# Patient Record
Sex: FEMALE | Race: WHITE | NOT HISPANIC OR LATINO | URBAN - METROPOLITAN AREA
[De-identification: names, ages, dates, MRNs, and addresses within clinical notes are randomized per-mention and may not be internally consistent; named-entity substitution may affect disease eponyms.]

---

## 2017-04-14 ENCOUNTER — OUTPATIENT (OUTPATIENT)
Dept: OUTPATIENT SERVICES | Facility: HOSPITAL | Age: 67
LOS: 1 days | End: 2017-04-14
Payer: MEDICARE

## 2017-04-14 PROCEDURE — 71020: CPT | Mod: 26

## 2018-03-11 ENCOUNTER — EMERGENCY (EMERGENCY)
Facility: HOSPITAL | Age: 68
LOS: 1 days | End: 2018-03-11
Payer: MEDICARE

## 2018-03-11 PROCEDURE — 99283 EMERGENCY DEPT VISIT LOW MDM: CPT

## 2019-05-09 ENCOUNTER — APPOINTMENT (OUTPATIENT)
Dept: RADIOLOGY | Facility: CLINIC | Age: 69
End: 2019-05-09
Payer: MEDICARE

## 2019-05-09 PROCEDURE — 71046 X-RAY EXAM CHEST 2 VIEWS: CPT

## 2020-12-31 ENCOUNTER — APPOINTMENT (OUTPATIENT)
Dept: CARDIOLOGY | Facility: CLINIC | Age: 70
End: 2020-12-31
Payer: MEDICARE

## 2020-12-31 ENCOUNTER — NON-APPOINTMENT (OUTPATIENT)
Age: 70
End: 2020-12-31

## 2020-12-31 VITALS
DIASTOLIC BLOOD PRESSURE: 68 MMHG | SYSTOLIC BLOOD PRESSURE: 122 MMHG | BODY MASS INDEX: 24.99 KG/M2 | WEIGHT: 150 LBS | HEART RATE: 67 BPM | HEIGHT: 65 IN

## 2020-12-31 DIAGNOSIS — Z78.9 OTHER SPECIFIED HEALTH STATUS: ICD-10-CM

## 2020-12-31 DIAGNOSIS — Z72.3 LACK OF PHYSICAL EXERCISE: ICD-10-CM

## 2020-12-31 DIAGNOSIS — Z80.3 FAMILY HISTORY OF MALIGNANT NEOPLASM OF BREAST: ICD-10-CM

## 2020-12-31 PROCEDURE — 99205 OFFICE O/P NEW HI 60 MIN: CPT

## 2020-12-31 PROCEDURE — 93000 ELECTROCARDIOGRAM COMPLETE: CPT

## 2020-12-31 NOTE — REASON FOR VISIT
[Consultation] : a consultation regarding [Pacemaker Evaluation] : pacemaker ~T evaluation ~C was performed [Supraventricular Tachycardia] : supraventricular tachycardia [FreeTextEntry1] : I saw this asymptomatic 70-year-old woman in cardiac consultation on  12/31/20\par She had no significant medical problems until 2 years ago when she developed an SVT.  She had an ablation which resulted in her developing atrial fibrillation.  Post procedure she required a permanent pacemaker.\par She is anticoagulated with Eliquis and takes propafenone to prevent the atrial fibrillation\par She has no other medical problems\par During her work-up prior to the ablation a stress echo showed 2 mm of ST horizontal depression.

## 2020-12-31 NOTE — REVIEW OF SYSTEMS
normal appearance , without tenderness upon palpation , no deformities , trachea midline , Thyroid normal size , no thyroid nodules , no masses , no JVD , thyroid nontender
[Negative] : Heme/Lymph

## 2020-12-31 NOTE — PHYSICAL EXAM
[General Appearance - Well Developed] : well developed [Normal Appearance] : normal appearance [Well Groomed] : well groomed [General Appearance - Well Nourished] : well nourished [No Deformities] : no deformities [General Appearance - In No Acute Distress] : no acute distress Patient [Normal Conjunctiva] : the conjunctiva exhibited no abnormalities [Eyelids - No Xanthelasma] : the eyelids demonstrated no xanthelasmas [Normal Oral Mucosa] : normal oral mucosa [No Oral Pallor] : no oral pallor [No Oral Cyanosis] : no oral cyanosis [Normal Jugular Venous A Waves Present] : normal jugular venous A waves present [Normal Jugular Venous V Waves Present] : normal jugular venous V waves present [No Jugular Venous Christianson A Waves] : no jugular venous chritsianson A waves [Heart Rate And Rhythm] : heart rate and rhythm were normal [Heart Sounds] : normal S1 and S2 [Murmurs] : no murmurs present [Respiration, Rhythm And Depth] : normal respiratory rhythm and effort [Exaggerated Use Of Accessory Muscles For Inspiration] : no accessory muscle use [Auscultation Breath Sounds / Voice Sounds] : lungs were clear to auscultation bilaterally [Abdomen Soft] : soft [Abdomen Tenderness] : non-tender [Abdomen Mass (___ Cm)] : no abdominal mass palpated [Abnormal Walk] : normal gait [Gait - Sufficient For Exercise Testing] : the gait was sufficient for exercise testing [Nail Clubbing] : no clubbing of the fingernails [Cyanosis, Localized] : no localized cyanosis [Petechial Hemorrhages (___cm)] : no petechial hemorrhages [Skin Color & Pigmentation] : normal skin color and pigmentation [] : no rash [No Venous Stasis] : no venous stasis [Skin Lesions] : no skin lesions [No Skin Ulcers] : no skin ulcer [No Xanthoma] : no  xanthoma was observed [Oriented To Time, Place, And Person] : oriented to person, place, and time [Affect] : the affect was normal [Mood] : the mood was normal [No Anxiety] : not feeling anxious

## 2020-12-31 NOTE — DISCUSSION/SUMMARY
[FreeTextEntry1] : I suggested that she have a cardiac CTA to evaluate her coronary arteries.\par She will return in 4 to 6 months and this will be set up.

## 2021-01-22 ENCOUNTER — APPOINTMENT (OUTPATIENT)
Dept: CARDIOLOGY | Facility: CLINIC | Age: 71
End: 2021-01-22
Payer: MEDICARE

## 2021-01-22 ENCOUNTER — EMERGENCY (EMERGENCY)
Facility: HOSPITAL | Age: 71
LOS: 1 days | End: 2021-01-22
Admitting: EMERGENCY MEDICINE
Payer: MEDICARE

## 2021-01-22 VITALS
BODY MASS INDEX: 24.66 KG/M2 | HEART RATE: 124 BPM | WEIGHT: 148 LBS | OXYGEN SATURATION: 98 % | DIASTOLIC BLOOD PRESSURE: 68 MMHG | SYSTOLIC BLOOD PRESSURE: 124 MMHG | HEIGHT: 65 IN | TEMPERATURE: 97.8 F

## 2021-01-22 PROCEDURE — 99285 EMERGENCY DEPT VISIT HI MDM: CPT

## 2021-01-22 PROCEDURE — 93010 ELECTROCARDIOGRAM REPORT: CPT

## 2021-01-22 PROCEDURE — 93000 ELECTROCARDIOGRAM COMPLETE: CPT | Mod: 59

## 2021-01-22 PROCEDURE — ZZZZZ: CPT

## 2021-01-22 PROCEDURE — 99214 OFFICE O/P EST MOD 30 MIN: CPT

## 2021-01-22 PROCEDURE — 93280 PM DEVICE PROGR EVAL DUAL: CPT

## 2021-01-22 NOTE — HISTORY OF PRESENT ILLNESS
[FreeTextEntry1] : CRYSTAL MCGRAW is a 70 year old female with a past medical history of SVT s/p ablation. Subsequent development of atrial fibrillation. Post procedure required PPM. \par \par During her work-up prior to the ablation a stress echo showed 2 mm of ST horizontal depression.\par \par Called with complaints of dizziness, lightheadedness, palpitations, SOB, and elevated heart rates since 7 am. There is lower extremity edema. Denies CP and syncope. Presented today for eval. After device interrogation, she became increasingly symptomatic. SBP 60. Assisted to table. 22 gauge IV placed. IVF administered. /70. Sx's abated. EMS arrived. Please also note patient took additional Propafenone today.\par \par See device note from today's interrogation for more details.\par \par \par Testing:\par \par EKG 1/22/21: SR at 64 bpm, MT interval 130 ms, QTc 385 ms nonspecific ST-T wave abnormalities \par \par \par Echo 7/11/19: Concentric LVH. LVSF normal. Mild LAE. Mild NATHALIE. RV size is borderline increased. Trace TR. Trace MT. Trace pericardial effusion is noted.

## 2021-01-22 NOTE — PHYSICAL EXAM
[General Appearance - Well Developed] : well developed [Normal Appearance] : normal appearance [Well Groomed] : well groomed [No Deformities] : no deformities [General Appearance - Well Nourished] : well nourished [General Appearance - In No Acute Distress] : no acute distress [Normal Conjunctiva] : the conjunctiva exhibited no abnormalities [Eyelids - No Xanthelasma] : the eyelids demonstrated no xanthelasmas [Normal Oral Mucosa] : normal oral mucosa [No Oral Pallor] : no oral pallor [No Oral Cyanosis] : no oral cyanosis [Normal Jugular Venous A Waves Present] : normal jugular venous A waves present [Normal Jugular Venous V Waves Present] : normal jugular venous V waves present [No Jugular Venous Christianson A Waves] : no jugular venous christianson A waves [Respiration, Rhythm And Depth] : normal respiratory rhythm and effort [Exaggerated Use Of Accessory Muscles For Inspiration] : no accessory muscle use [Auscultation Breath Sounds / Voice Sounds] : lungs were clear to auscultation bilaterally [Heart Rate And Rhythm] : heart rate and rhythm were normal [Heart Sounds] : normal S1 and S2 [Murmurs] : no murmurs present [Abdomen Soft] : soft [Abdomen Tenderness] : non-tender [Abdomen Mass (___ Cm)] : no abdominal mass palpated [Abnormal Walk] : normal gait [Gait - Sufficient For Exercise Testing] : the gait was sufficient for exercise testing [Nail Clubbing] : no clubbing of the fingernails [Cyanosis, Localized] : no localized cyanosis [Petechial Hemorrhages (___cm)] : no petechial hemorrhages [Skin Color & Pigmentation] : normal skin color and pigmentation [] : no rash [No Venous Stasis] : no venous stasis [Skin Lesions] : no skin lesions [No Skin Ulcers] : no skin ulcer [No Xanthoma] : no  xanthoma was observed [Oriented To Time, Place, And Person] : oriented to person, place, and time [Affect] : the affect was normal [Mood] : the mood was normal [No Anxiety] : not feeling anxious

## 2021-01-22 NOTE — PROCEDURE
[NSR] : normal sinus rhythm [Pacemaker] : pacemaker [DDDR] : DDDR [Lead Imp:  ___ohms] : lead impedance was [unfilled] ohms [Sensing Amplitude ___mv] : sensing amplitude was [unfilled] mv [___V @] : [unfilled] V [___ ms] : [unfilled] ms [de-identified] : [place] [de-identified] : Ingevity MRI [de-identified] : [id number] [de-identified] : 5/2019 [de-identified] :  [de-identified] : Battery 14 years [de-identified] : AP 43%,  1%.\par \par Settings:\par \par Atrium Amplitude 2.5 V, pulse width 0.5 ms, sensitivity 0.25 mV\par Ventricle Ampltidue 2.5V ---> (changed to 3V due to output of 1.5), pulse width 0.5 ms, fixed 2.5mV\par \par Episodes:\par \par Multiple episodes of SVT/AF. Minimal EGMs to review. Some appear to be farfield. Longest AT/AF 1 hour and 33 minutes. On AC and Propafenone.\par Episodes labeled NSVT that were able to be reviewed appear to be farfield.\par \par Patient with symptomatic hypotension after DVC. Note she presented for exam today with similar sx's. Sent to [facility] via EMS. Will f/u with Dr. Lopez and EP after hospitalization. Please also note patient took extra Propafenone prior to her arrival today.\par \par F/U as above\par Discussed red flag symptoms, which would warrant sooner or emergent medical evaluation.\par Any questions and concerns were addressed and resolved.\par \par Sincerely,\par Mis Albarado FN-BC\par Patient's history, testing, and plan was reviewed with supervising physician, Dr. Blu Meza

## 2021-01-22 NOTE — PHYSICAL EXAM
[General Appearance - Well Developed] : well developed [Normal Appearance] : normal appearance [Well Groomed] : well groomed [General Appearance - Well Nourished] : well nourished [No Deformities] : no deformities [General Appearance - In No Acute Distress] : no acute distress [Normal Conjunctiva] : the conjunctiva exhibited no abnormalities [Eyelids - No Xanthelasma] : the eyelids demonstrated no xanthelasmas [Normal Oral Mucosa] : normal oral mucosa [No Oral Pallor] : no oral pallor [No Oral Cyanosis] : no oral cyanosis [Normal Jugular Venous A Waves Present] : normal jugular venous A waves present [Normal Jugular Venous V Waves Present] : normal jugular venous V waves present [No Jugular Venous Christianson A Waves] : no jugular venous christianson A waves [Respiration, Rhythm And Depth] : normal respiratory rhythm and effort [Exaggerated Use Of Accessory Muscles For Inspiration] : no accessory muscle use [Auscultation Breath Sounds / Voice Sounds] : lungs were clear to auscultation bilaterally [Heart Rate And Rhythm] : heart rate and rhythm were normal [Heart Sounds] : normal S1 and S2 [Murmurs] : no murmurs present [Abdomen Soft] : soft [Abdomen Tenderness] : non-tender [Abdomen Mass (___ Cm)] : no abdominal mass palpated [Abnormal Walk] : normal gait [Gait - Sufficient For Exercise Testing] : the gait was sufficient for exercise testing [Nail Clubbing] : no clubbing of the fingernails [Cyanosis, Localized] : no localized cyanosis [Petechial Hemorrhages (___cm)] : no petechial hemorrhages [Skin Color & Pigmentation] : normal skin color and pigmentation [] : no rash [No Venous Stasis] : no venous stasis [Skin Lesions] : no skin lesions [No Skin Ulcers] : no skin ulcer [No Xanthoma] : no  xanthoma was observed [Oriented To Time, Place, And Person] : oriented to person, place, and time [Affect] : the affect was normal [Mood] : the mood was normal [No Anxiety] : not feeling anxious

## 2021-01-22 NOTE — HISTORY OF PRESENT ILLNESS
[FreeTextEntry1] : CRYSTAL MCGRAW is a 70 year old female with a past medical history of SVT s/p ablation. Subsequent development of atrial fibrillation. Post procedure required PPM. \par \par During her work-up prior to the ablation a stress echo showed 2 mm of ST horizontal depression.\par \par Called with complaints of dizziness, lightheadedness, palpitations, SOB, and elevated heart rates since 7 am. There is lower extremity edema. Denies CP and syncope. Presented today for eval. After device interrogation, she became increasingly symptomatic. SBP 60. Assisted to table. 22 gauge IV placed. IVF administered. /70. Sx's abated. EMS arrived. Please also note patient took additional Propafenone today.\par \par See device note from today's interrogation for more details.\par \par \par Testing:\par \par EKG 1/22/21: SR at 64 bpm, WI interval 130 ms, QTc 385 ms nonspecific ST-T wave abnormalities \par \par \par Echo 7/11/19: Concentric LVH. LVSF normal. Mild LAE. Mild NATHALIE. RV size is borderline increased. Trace TR. Trace WI. Trace pericardial effusion is noted.

## 2021-01-22 NOTE — ASSESSMENT
[FreeTextEntry1] : CRYSTAL MCGRAW is a 70 year old F who presents today Jan 22, 2021 with the above history and the following active issues:\par \par SVT/AFIB/DC PPM. Episodes of ATR noted on device but no EGM to review. SR on 12 lead EKG today.\par \par Symptomatic hypotension: Patient sent to ED for work up.\par \par Ongoing f/u with PCP.\par \par F/U after hospitalization and EP eval also recommended.\par Discussed red flag symptoms, which would warrant sooner or emergent medical evaluation.\par Any questions and concerns were addressed and resolved.\par \par Sincerely,\par Mis Albarado FN-BC\par Patient's history, testing, and plan was reviewed with supervising physician, Dr. Blu Meza\par

## 2021-01-22 NOTE — PROCEDURE
[NSR] : normal sinus rhythm [Pacemaker] : pacemaker [DDDR] : DDDR [Lead Imp:  ___ohms] : lead impedance was [unfilled] ohms [Sensing Amplitude ___mv] : sensing amplitude was [unfilled] mv [___V @] : [unfilled] V [___ ms] : [unfilled] ms [de-identified] : [place] [de-identified] : Ingevity MRI [de-identified] : [id number] [de-identified] : 5/2019 [de-identified] :  [de-identified] : Battery 14 years [de-identified] : AP 43%,  1%.\par \par Settings:\par \par Atrium Amplitude 2.5 V, pulse width 0.5 ms, sensitivity 0.25 mV\par Ventricle Ampltidue 2.5V ---> (changed to 3V due to output of 1.5), pulse width 0.5 ms, fixed 2.5mV\par \par Episodes:\par \par Multiple episodes of SVT/AF. Minimal EGMs to review. Some appear to be farfield. Longest AT/AF 1 hour and 33 minutes. On AC and Propafenone.\par Episodes labeled NSVT that were able to be reviewed appear to be farfield.\par \par Patient with symptomatic hypotension after DVC. Note she presented for exam today with similar sx's. [place] [facility] Will f/u with Dr. Lopez and EP after hospitalization. Please also note patient took extra Propafenone prior to her arrival today.\par \par F/U as above\par Discussed red flag symptoms, which would warrant sooner or emergent medical evaluation.\par Any questions and concerns were addressed and resolved.\par \par Sincerely,\par Mis Albarado FN-BC\par Patient's history, testing, and plan was reviewed with supervising physician, Dr. Blu Meza

## 2021-01-22 NOTE — ADDENDUM
[FreeTextEntry1] : Unclear reason for symptoms.  She had an episode of acute hypotension of unclear etiology - doubt related to interrogation as max HR during was only 95 bpm.  IV established and NS bolus given.  EMS called and patient transported to ER.

## 2021-02-08 ENCOUNTER — APPOINTMENT (OUTPATIENT)
Dept: ELECTROPHYSIOLOGY | Facility: CLINIC | Age: 71
End: 2021-02-08
Payer: MEDICARE

## 2021-02-08 ENCOUNTER — NON-APPOINTMENT (OUTPATIENT)
Age: 71
End: 2021-02-08

## 2021-02-08 VITALS
SYSTOLIC BLOOD PRESSURE: 134 MMHG | OXYGEN SATURATION: 99 % | BODY MASS INDEX: 23.82 KG/M2 | HEIGHT: 65 IN | HEART RATE: 66 BPM | DIASTOLIC BLOOD PRESSURE: 70 MMHG | TEMPERATURE: 97.8 F | WEIGHT: 143 LBS

## 2021-02-08 PROCEDURE — 99202 OFFICE O/P NEW SF 15 MIN: CPT

## 2021-02-21 NOTE — DISCUSSION/SUMMARY
[FreeTextEntry1] : This is a 69 yo female with h/o Gaucher's disease, SVT, Atach, Afib, s/p ablation x 2 ([place] 2011 and [place] 2019) and Sinus node dysfunction and a PM implant. She presents today as she is complaining of feeling "fast heart beats" associated at times with SOB. The pacemaker interrogation revealed normal pacing and sensing thresholds. The P wave is small at baseline. She A paces 43% and V paces 1%. Review of parameters revealed Rhythmiq is active. With this feature the pacemaker behaves in an AAI mode until there is a non-conducted P wave and then mode switches to VVI mode. A fair amount of patient's feel the loss of AV synchrony in the VVI mode. That being said, Rhythmiq was deactivated to avoid VVI mode. In addition, the AV delay was extended to avoid V pacing.  Her indication for pacing was sinus node dysfunction so she should not need V pacing. Would not make any medication changes during this visit. Questions were answered and the patient verbalized understanding. The assessment, findings and plan were d/w Dr. Hadley who was present during the visit. .

## 2021-02-21 NOTE — ASSESSMENT
[FreeTextEntry1] : [employer] PM interrogation revealed normal pacing and sensing thresholds. The P wave is small at baseline. Stored data revealed atrial high rate episodes c/w few seconds of ATach. In addition, there were several events that were c/w farfield QRS oversensing. She A paces 43% and V paces 1%. Estimated battery longevity is 13.5 years.

## 2021-02-21 NOTE — HISTORY OF PRESENT ILLNESS
[FreeTextEntry1] : This is a 69 yo female with h/o Gaucher's disease, SVT, Atach, Afib, s/p ablation x 2 ([place] 2011 and [place] 2019) and Sinus node dysfunction and a PM implant. She presents today as she is complaining of feeling "fast heart beats" associated at times with SOB. She states she is otherwise active and without c/o CP, sustained palpitations, lightheadedness or syncope. Her most recent interrogation revealed normal function with episodes labeled SVT/AF. She remains on Propafenone and Eliquis. \par \par Echo June 11, 2019 EF 63%, mild LAE, trace TR

## 2021-02-21 NOTE — REVIEW OF SYSTEMS
[Shortness Of Breath] : shortness of breath [Palpitations] : palpitations [Negative] : Integumentary [Chills] : no chills [Fever] : no fever [Feeling Fatigued] : not feeling fatigued [Dizziness] : no dizziness [Easy Bleeding] : no tendency for easy bleeding [Easy Bruising] : no tendency for easy bruising

## 2021-02-21 NOTE — REASON FOR VISIT
[Consultation] : a consultation regarding [FreeTextEntry2] : atrial tach, atrial fibrillation, PM and recent fast heart beats.

## 2021-02-21 NOTE — END OF VISIT
[FreeTextEntry3] : The patient was seen with the NP.  We discussed the history physical findings and plans.  Agree with the findings and recommendations.\par

## 2021-02-21 NOTE — PHYSICAL EXAM
[General Appearance - Well Developed] : well developed [General Appearance - Well Nourished] : well nourished [Normal Oral Mucosa] : normal oral mucosa [Heart Rate And Rhythm] : heart rate and rhythm were normal [Heart Sounds] : normal S1 and S2 [Murmurs] : no murmurs present [] : no respiratory distress [Respiration, Rhythm And Depth] : normal respiratory rhythm and effort [Auscultation Breath Sounds / Voice Sounds] : lungs were clear to auscultation bilaterally [Bowel Sounds] : normal bowel sounds [Abdomen Soft] : soft [Abdomen Tenderness] : non-tender [Abnormal Walk] : normal gait [Cyanosis, Localized] : no localized cyanosis [Nail Clubbing] : no clubbing of the fingernails [Skin Color & Pigmentation] : normal skin color and pigmentation [Oriented To Time, Place, And Person] : oriented to person, place, and time [Skin Turgor] : normal skin turgor [Impaired Insight] : insight and judgment were intact [FreeTextEntry1] : no edema

## 2021-03-05 ENCOUNTER — APPOINTMENT (OUTPATIENT)
Dept: CARDIOLOGY | Facility: CLINIC | Age: 71
End: 2021-03-05

## 2021-04-02 ENCOUNTER — OUTPATIENT (OUTPATIENT)
Dept: OUTPATIENT SERVICES | Facility: HOSPITAL | Age: 71
LOS: 1 days | End: 2021-04-02

## 2021-04-23 ENCOUNTER — APPOINTMENT (OUTPATIENT)
Dept: CARDIOLOGY | Facility: CLINIC | Age: 71
End: 2021-04-23
Payer: MEDICARE

## 2021-04-23 VITALS
OXYGEN SATURATION: 95 % | SYSTOLIC BLOOD PRESSURE: 136 MMHG | DIASTOLIC BLOOD PRESSURE: 60 MMHG | HEART RATE: 66 BPM | HEIGHT: 65 IN | TEMPERATURE: 96.9 F | BODY MASS INDEX: 23.82 KG/M2 | WEIGHT: 143 LBS

## 2021-04-23 PROCEDURE — 99215 OFFICE O/P EST HI 40 MIN: CPT

## 2021-04-23 NOTE — HISTORY OF PRESENT ILLNESS
[FreeTextEntry1] : This is a 69 yo female with h/o Gaucher's disease, SVT, Atach, Afib, s/p ablation x 2 ([place] 2011 and [place] 2019) and Sinus node dysfunction and a PM implant. She presents today as she is complaining of feeling SOB the past two day associated with fatigue. Feels fine today. No exertional chest pain or pressure. Patient had her PPM interrogated and setting changed by EP in February 2021 for rapid heart beat sensation and dyspnea. She states that helped with her symptoms.  She remains on Propafenone and Eliquis. \par \par

## 2021-04-23 NOTE — REASON FOR VISIT
[Follow-Up - Clinic] : a clinic follow-up of [Dyspnea] : dyspnea [FreeTextEntry1] : paroxysmal atrial fibrillation, paroxysmal atrial tachycardia, s/p PPM

## 2021-04-23 NOTE — REVIEW OF SYSTEMS
[Fever] : no fever [Chills] : no chills [Feeling Fatigued] : feeling fatigued [see HPI] : see HPI [Shortness Of Breath] : shortness of breath [Palpitations] : palpitations [Joint Pain] : joint pain [Joint Stiffness] : joint stiffness [Dizziness] : no dizziness [Easy Bleeding] : no tendency for easy bleeding [Easy Bruising] : no tendency for easy bruising [Negative] : Integumentary

## 2021-04-23 NOTE — DISCUSSION/SUMMARY
[FreeTextEntry1] : 1. PAF/paroxysmal AT: s/p ablation x 2 ([place] 2011 and [place] 2019) and Sinus node dysfunction and a PM implant. Follows with EP. Continue propafenone and Eliquis 5mg BID (high risk medication with no signs of toxicity).\par \par 2. Dyspnea: recommend echocardiogram and pharmacologic nuclear stress testing. Please note it should be pharmacologic nuclear stress testing given pacemaker. \par \par Follow up after testing.

## 2021-04-23 NOTE — PHYSICAL EXAM
[General Appearance - Well Developed] : well developed [General Appearance - Well Nourished] : well nourished [Normal Conjunctiva] : the conjunctiva exhibited no abnormalities [Eyelids - No Xanthelasma] : the eyelids demonstrated no xanthelasmas [FreeTextEntry1] : No JVD, no carotid artery bruits auscultated bilaterally [Respiration, Rhythm And Depth] : normal respiratory rhythm and effort [Auscultation Breath Sounds / Voice Sounds] : lungs were clear to auscultation bilaterally [Heart Rate And Rhythm] : heart rate and rhythm were normal [Murmurs] : no murmurs present [Heart Sounds] : normal S1 and S2 [Edema] : no peripheral edema present [Abnormal Walk] : normal gait [Nail Clubbing] : no clubbing of the fingernails [Cyanosis, Localized] : no localized cyanosis [] : no ischemic changes

## 2021-04-30 ENCOUNTER — APPOINTMENT (OUTPATIENT)
Dept: CARDIOLOGY | Facility: CLINIC | Age: 71
End: 2021-04-30
Payer: MEDICARE

## 2021-04-30 PROCEDURE — 78452 HT MUSCLE IMAGE SPECT MULT: CPT

## 2021-04-30 PROCEDURE — A9502: CPT

## 2021-04-30 PROCEDURE — 93015 CV STRESS TEST SUPVJ I&R: CPT

## 2021-05-11 ENCOUNTER — APPOINTMENT (OUTPATIENT)
Dept: CARDIOLOGY | Facility: CLINIC | Age: 71
End: 2021-05-11
Payer: MEDICARE

## 2021-05-11 PROCEDURE — 93306 TTE W/DOPPLER COMPLETE: CPT

## 2021-05-12 ENCOUNTER — APPOINTMENT (OUTPATIENT)
Dept: ELECTROPHYSIOLOGY | Facility: CLINIC | Age: 71
End: 2021-05-12

## 2021-05-14 ENCOUNTER — APPOINTMENT (OUTPATIENT)
Dept: CARDIOLOGY | Facility: CLINIC | Age: 71
End: 2021-05-14

## 2021-08-05 ENCOUNTER — APPOINTMENT (OUTPATIENT)
Dept: OPHTHALMOLOGY | Facility: CLINIC | Age: 71
End: 2021-08-05
Payer: MEDICARE

## 2021-08-05 ENCOUNTER — NON-APPOINTMENT (OUTPATIENT)
Age: 71
End: 2021-08-05

## 2021-08-05 PROCEDURE — 99204 OFFICE O/P NEW MOD 45 MIN: CPT

## 2021-09-10 ENCOUNTER — APPOINTMENT (OUTPATIENT)
Dept: OPHTHALMOLOGY | Facility: CLINIC | Age: 71
End: 2021-09-10

## 2021-10-11 ENCOUNTER — APPOINTMENT (OUTPATIENT)
Dept: CARDIOLOGY | Facility: CLINIC | Age: 71
End: 2021-10-11
Payer: MEDICARE

## 2021-10-11 ENCOUNTER — NON-APPOINTMENT (OUTPATIENT)
Age: 71
End: 2021-10-11

## 2021-10-11 VITALS
TEMPERATURE: 97.1 F | HEART RATE: 64 BPM | DIASTOLIC BLOOD PRESSURE: 62 MMHG | HEIGHT: 65 IN | WEIGHT: 150 LBS | BODY MASS INDEX: 24.99 KG/M2 | SYSTOLIC BLOOD PRESSURE: 136 MMHG | OXYGEN SATURATION: 98 %

## 2021-10-11 DIAGNOSIS — Z87.891 PERSONAL HISTORY OF NICOTINE DEPENDENCE: ICD-10-CM

## 2021-10-11 PROCEDURE — 93000 ELECTROCARDIOGRAM COMPLETE: CPT

## 2021-10-11 PROCEDURE — 99214 OFFICE O/P EST MOD 30 MIN: CPT

## 2021-10-11 RX ORDER — APIXABAN 5 MG/1
5 TABLET, FILM COATED ORAL
Qty: 30 | Refills: 0 | Status: ACTIVE | COMMUNITY
Start: 2020-12-31

## 2021-10-11 NOTE — DISCUSSION/SUMMARY
[FreeTextEntry1] : \par 70 yo female with h/o Gaucher's disease, SVT, Atach, Afib, s/p ablation x 2 ([place] 2011 and [place] 2019) and Sinus node dysfunction and a PM implant here for appointment due to shortness of breath.  Atypical sensation not always with exertion.\par Had similar reports on prior visits per my chart review.  Most recently as in April 2021.\par For that she underwent echocardiogram in May 2021 and nuclear stress test April 2021.  These were significant for: Unremarkable echocardiogram and normal perfusion on nuclear stress test.\par Of note, she has not undergone device interrogation since January 2021.\par \par \par 1. Dyspnea: no alarm s/s. unremarkable echo and NST with these symptoms. needs device interrogation and correlate with any symptoms. maintain BP log as uncontrolled htn can cause symptoms. CV labwork ordered.\par \par 2. PAF/paroxysmal AT: s/p ablation x 2 ([place] 2011 and [place] 2019) and Sinus node dysfunction and a PM implant. Follows with EP. Continue propafenone and Eliquis 5mg BID (high risk medication with no signs of toxicity).\par \par

## 2021-10-11 NOTE — PHYSICAL EXAM
[General Appearance - Well Developed] : well developed [General Appearance - Well Nourished] : well nourished [Normal Conjunctiva] : the conjunctiva exhibited no abnormalities [Eyelids - No Xanthelasma] : the eyelids demonstrated no xanthelasmas [Respiration, Rhythm And Depth] : normal respiratory rhythm and effort [Auscultation Breath Sounds / Voice Sounds] : lungs were clear to auscultation bilaterally [Heart Sounds] : normal S1 and S2 [Heart Rate And Rhythm] : heart rate and rhythm were normal [Murmurs] : no murmurs present [Edema] : no peripheral edema present [Abnormal Walk] : normal gait [Nail Clubbing] : no clubbing of the fingernails [] : no ischemic changes [Cyanosis, Localized] : no localized cyanosis [FreeTextEntry1] : No JVD, no carotid artery bruits auscultated bilaterally

## 2021-10-11 NOTE — HISTORY OF PRESENT ILLNESS
[FreeTextEntry1] : \par 70 yo female with h/o Gaucher's disease, SVT, Atach, Afib, s/p ablation x 2 ([place] 2011 and [place] 2019) and Sinus node dysfunction and a PM implant here for appointment due to shortness of breath.  Atypical sensation not always with exertion.\par \par Had similar reports on prior visits per my chart review.  Most recently as in April 2021.\par \par For that she underwent echocardiogram in May 2021 and nuclear stress test April 2021.  These were significant for: Unremarkable echocardiogram and normal perfusion on nuclear stress test.\par \par Of note, she has not undergone device interrogation since January 2021.\par \par

## 2021-10-15 ENCOUNTER — NON-APPOINTMENT (OUTPATIENT)
Age: 71
End: 2021-10-15

## 2021-10-25 ENCOUNTER — APPOINTMENT (OUTPATIENT)
Dept: CARDIOLOGY | Facility: CLINIC | Age: 71
End: 2021-10-25

## 2021-12-03 ENCOUNTER — APPOINTMENT (OUTPATIENT)
Dept: CARDIOLOGY | Facility: CLINIC | Age: 71
End: 2021-12-03
Payer: MEDICARE

## 2021-12-03 VITALS
OXYGEN SATURATION: 98 % | HEIGHT: 65 IN | SYSTOLIC BLOOD PRESSURE: 120 MMHG | DIASTOLIC BLOOD PRESSURE: 52 MMHG | TEMPERATURE: 97.3 F | WEIGHT: 143 LBS | BODY MASS INDEX: 23.82 KG/M2 | HEART RATE: 62 BPM

## 2021-12-03 PROCEDURE — 93280 PM DEVICE PROGR EVAL DUAL: CPT

## 2021-12-03 NOTE — PROCEDURE
[No] : not [NSR] : normal sinus rhythm [Pacemaker] : pacemaker [DDDR] : DDDR [Threshold Testing Performed] : Threshold testing was performed [Lead Imp:  ___ohms] : lead impedance was [unfilled] ohms [Sensing Amplitude ___mv] : sensing amplitude was [unfilled] mv [___V @] : [unfilled] V [___ ms] : [unfilled] ms [None] : none [Counters Reset] : the counters were reset [de-identified] : [place] [de-identified] : Ingevity MRI [de-identified] : [id number] [de-identified] : 5/2019 [de-identified] :  [de-identified] : Battery 13 years [de-identified] : \par Settings:\par Atrium Amplitude 2V, pulse width 0.5 ms, sensitivity 0.25 mV\par Ventricle Ampltidue 2.5V, pulse width 0.5 ms, fixed 2.5mV\par \par Episodes:\par \par Multiple episodes of SVT/AF. AF burden recorded <1%. Overall rates are controlled. Minimal EGMs to review. Some appear to be farfield. Longest of 20min, most only a few minutes. On AC and Propafenone.\par Episodes labeled NSVT that were able to be reviewed appear to be farfield.\par \par Seeing EP, RJ next week. Further arrhythmia management to be determined at that time. She has home monitor but is waiting for a tech savvy friend to come help her set it up. I encouraged her to do so. Otherwise device management per EP. \par

## 2021-12-08 RX ORDER — PROPAFENONE HYDROCHLORIDE 225 MG/1
225 TABLET, FILM COATED ORAL
Qty: 180 | Refills: 0 | Status: DISCONTINUED | COMMUNITY
Start: 2020-12-31 | End: 2021-12-08

## 2021-12-13 ENCOUNTER — APPOINTMENT (OUTPATIENT)
Dept: ELECTROPHYSIOLOGY | Facility: CLINIC | Age: 71
End: 2021-12-13

## 2022-01-03 ENCOUNTER — APPOINTMENT (OUTPATIENT)
Dept: ELECTROPHYSIOLOGY | Facility: CLINIC | Age: 72
End: 2022-01-03
Payer: MEDICARE

## 2022-01-03 VITALS
DIASTOLIC BLOOD PRESSURE: 64 MMHG | HEART RATE: 66 BPM | TEMPERATURE: 96.3 F | WEIGHT: 143 LBS | BODY MASS INDEX: 23.82 KG/M2 | HEIGHT: 65 IN | OXYGEN SATURATION: 100 % | SYSTOLIC BLOOD PRESSURE: 124 MMHG

## 2022-01-03 PROCEDURE — 99214 OFFICE O/P EST MOD 30 MIN: CPT

## 2022-01-03 PROCEDURE — 93000 ELECTROCARDIOGRAM COMPLETE: CPT

## 2022-01-23 ENCOUNTER — NON-APPOINTMENT (OUTPATIENT)
Age: 72
End: 2022-01-23

## 2022-01-23 NOTE — HISTORY OF PRESENT ILLNESS
[FreeTextEntry1] : Follow-up evaluation for 71-year-old woman with a prior history of  Gaucher's disease, SVT, Atach, Afib, s/p ablation x 2 ([place] 2011 and [place] 2019) , Sinus node dysfunction and status post dual-chamber [employer] permanent pacemaker.\par \par Recent pacemaker interrogation showed multiple episodes of A. fib and she was referred for further management. She has been on anticoagulation and treatment with propafenone.\par \par Patient occasionally feels a fast heartbeat and shortness of breath. She denies other symptoms. She denies chest pain, dizziness, lightheadedness, syncope, presyncope.\par \par Pacemaker interrogation showed AT AF burden less than 1%. She is atrial paced 49% of the time and ventricular paced less than 1%. Review of the tachycardia episodes shows atrial fibrillation with increased ventricular response 130-150 range.\par \par Echocardiogram from 5/11/2021 showed EF 60 to 65%, LA 4.0 cm, LA volume index 32 cc/m², minimal mitral regurgitation and normal right atrium. No pericardial effusion.\par \par Nuclear stress test performed 4/30/2020 showed no evidence of ischemia or infarction.\par \par Denies history of hypertension, diabetes, stroke, TIA, prior myocardial infarction or congestive heart failure.\par \par \par

## 2022-01-23 NOTE — CARDIOLOGY SUMMARY
[de-identified] : Sinus  Rhythm 60 bpm\par \par LA interval 140 ms, QRS duration 100 ms, QT interval 430 ms

## 2022-01-23 NOTE — DISCUSSION/SUMMARY
[FreeTextEntry1] : This is a 72 yo woman with h/o Gaucher's disease, SVT, Atach, Afib, s/p ablation x 2 ([place] 2011 and [place] 2019) and Sinus node dysfunction and status post dual-chamber pacemaker implant.\par \par She has had symptoms of occasional fast heartbeats/rapid heartbeat/irregular heartbeat and shortness of breath.\par \par Echo showed preserved left ventricular function, no valvular disease and normal left atrial size.\par \par Previous nuclear stress test showed no evidence of ischemia or infarction.\par \par  [id number] score 2\par \par Findings on pacemaker interrogation: Episodes of atrial fibrillation short duration while burden currently less than 1%. Atrial paced 49% and ventricular paced less than 1%. Remaining battery longevity 12.5 years.\par \par Her symptoms may be related to episodes of A. fib. We discussed treatment options. She is currently on propafenone ER to 225 mg twice daily. She has prefer to continue medical therapy for now. I would recommend increasing her dose of propafenone and will add an additional 150 mg tablet. If this fails we will consider different antiarrhythmic or we discussed that catheter ablation option. Patient will follow-up in 4 to 6 months for us to reassess both the end of burden as well as her symptoms. Should the symptoms increase before then we will reevaluate sooner. Recommend that she continue on anticoagulation. \par

## 2022-01-23 NOTE — REVIEW OF SYSTEMS
[Feeling Fatigued] : not feeling fatigued [Blurry Vision] : no blurred vision [Sore Throat] : no sore throat [SOB] : shortness of breath [Chest Discomfort] : no chest discomfort [Palpitations] : palpitations [Orthopnea] : no orthopnea [Cough] : no cough [Abdominal Pain] : no abdominal pain [Dizziness] : no dizziness

## 2022-01-23 NOTE — PHYSICAL EXAM
[No Acute Distress] : no acute distress [Normal Conjunctiva] : normal conjunctiva [Normal Venous Pressure] : normal venous pressure [Normal S1, S2] : normal S1, S2 [No Murmur] : no murmur [Clear Lung Fields] : clear lung fields [Non Tender] : non-tender [Normal Gait] : normal gait [No Edema] : no edema [No Focal Deficits] : no focal deficits [Alert and Oriented] : alert and oriented

## 2022-04-12 ENCOUNTER — APPOINTMENT (OUTPATIENT)
Dept: ELECTROPHYSIOLOGY | Facility: CLINIC | Age: 72
End: 2022-04-12
Payer: MEDICARE

## 2022-04-12 ENCOUNTER — NON-APPOINTMENT (OUTPATIENT)
Age: 72
End: 2022-04-12

## 2022-04-12 ENCOUNTER — APPOINTMENT (OUTPATIENT)
Dept: CARDIOLOGY | Facility: CLINIC | Age: 72
End: 2022-04-12
Payer: MEDICARE

## 2022-04-12 VITALS
DIASTOLIC BLOOD PRESSURE: 70 MMHG | HEART RATE: 69 BPM | WEIGHT: 145 LBS | TEMPERATURE: 97 F | BODY MASS INDEX: 24.13 KG/M2 | OXYGEN SATURATION: 97 % | SYSTOLIC BLOOD PRESSURE: 128 MMHG

## 2022-04-12 VITALS
BODY MASS INDEX: 24.16 KG/M2 | WEIGHT: 145 LBS | OXYGEN SATURATION: 100 % | HEART RATE: 64 BPM | DIASTOLIC BLOOD PRESSURE: 70 MMHG | TEMPERATURE: 97 F | SYSTOLIC BLOOD PRESSURE: 128 MMHG | HEIGHT: 65 IN

## 2022-04-12 DIAGNOSIS — Z98.890 OTHER SPECIFIED POSTPROCEDURAL STATES: ICD-10-CM

## 2022-04-12 DIAGNOSIS — R06.00 DYSPNEA, UNSPECIFIED: ICD-10-CM

## 2022-04-12 DIAGNOSIS — Z86.79 PERSONAL HISTORY OF OTHER DISEASES OF THE CIRCULATORY SYSTEM: ICD-10-CM

## 2022-04-12 DIAGNOSIS — R00.2 PALPITATIONS: ICD-10-CM

## 2022-04-12 DIAGNOSIS — Z95.0 PRESENCE OF CARDIAC PACEMAKER: ICD-10-CM

## 2022-04-12 DIAGNOSIS — I48.91 UNSPECIFIED ATRIAL FIBRILLATION: ICD-10-CM

## 2022-04-12 DIAGNOSIS — Z79.01 LONG TERM (CURRENT) USE OF ANTICOAGULANTS: ICD-10-CM

## 2022-04-12 DIAGNOSIS — Z79.899 OTHER LONG TERM (CURRENT) DRUG THERAPY: ICD-10-CM

## 2022-04-12 DIAGNOSIS — Z86.79 OTHER SPECIFIED POSTPROCEDURAL STATES: ICD-10-CM

## 2022-04-12 DIAGNOSIS — Z00.00 ENCOUNTER FOR GENERAL ADULT MEDICAL EXAMINATION W/OUT ABNORMAL FINDINGS: ICD-10-CM

## 2022-04-12 PROCEDURE — 99215 OFFICE O/P EST HI 40 MIN: CPT

## 2022-04-12 PROCEDURE — 93000 ELECTROCARDIOGRAM COMPLETE: CPT

## 2022-04-12 PROCEDURE — 99214 OFFICE O/P EST MOD 30 MIN: CPT

## 2022-04-12 RX ORDER — PROPAFENONE HYDROCHLORIDE 150 MG/1
150 TABLET, FILM COATED ORAL
Qty: 60 | Refills: 1 | Status: DISCONTINUED | COMMUNITY
Start: 2022-01-03 | End: 2022-04-12

## 2022-04-12 RX ORDER — FLECAINIDE ACETATE 100 MG/1
100 TABLET ORAL
Qty: 120 | Refills: 1 | Status: ACTIVE | COMMUNITY
Start: 2022-04-12 | End: 1900-01-01

## 2022-04-12 RX ORDER — PROPAFENONE 225 MG/1
225 CAPSULE, EXTENDED RELEASE ORAL
Qty: 180 | Refills: 1 | Status: DISCONTINUED | COMMUNITY
Start: 2021-12-08 | End: 2022-04-12

## 2022-04-14 RX ORDER — METOPROLOL SUCCINATE 25 MG/1
25 TABLET, EXTENDED RELEASE ORAL
Qty: 120 | Refills: 1 | Status: ACTIVE | COMMUNITY
Start: 2022-04-12 | End: 1900-01-01

## 2022-05-01 PROBLEM — I48.91 ATRIAL FIBRILLATION: Status: ACTIVE | Noted: 2020-12-31

## 2022-05-01 PROBLEM — Z95.0 CARDIAC PACEMAKER: Status: ACTIVE | Noted: 2020-12-31

## 2022-05-01 PROBLEM — Z98.890 S/P ABLATION OF ATRIAL FIBRILLATION: Status: ACTIVE | Noted: 2020-12-31

## 2022-05-01 PROBLEM — R06.00 DYSPNEA: Status: ACTIVE | Noted: 2021-04-23

## 2022-05-01 NOTE — HISTORY OF PRESENT ILLNESS
[FreeTextEntry1] : Patient is an 71-year-old woman who is seen in follow-up evaluation for her recurrent tachycardia.\par \par She has a prior history of  Gaucher's disease, SVT, Atach, Afib, s/p ablation x 2 ([place] 2011 and [place] 2019) , Sinus node dysfunction and status post dual-chamber [employer] permanent pacemaker.\par \par Pacemaker interrogations have shown multiple episodes of A. fib.  She has been on anticoagulation and treatment with propafenone.\par \par Symptoms: Feels occasional shortness of breath and rapid heartbeat.   She denies chest pain, dizziness, lightheadedness, syncope, presyncope.\par \par Pacemaker interrogation: Showed AT AF burden less than 1%. She is atrial paced 52% of the time and ventricular paced less than 1%. Review of the tachycardia episodes shows  atrial tachycardia and atrial fibrillation with increased ventricular response 130-150 range.\par \par Echocardiogram from 5/11/2021 showed EF 60 to 65%, LA 4.0 cm, LA volume index 32 cc/m², minimal mitral regurgitation and normal right atrium. No pericardial effusion.\par \par Nuclear stress test performed 4/30/2020 showed no evidence of ischemia or infarction.\par \par Denies history of hypertension, diabetes, stroke, TIA, prior myocardial infarction or congestive heart failure.\par \par \par

## 2022-05-01 NOTE — DISCUSSION/SUMMARY
[FreeTextEntry1] : This is a 72 yo woman with h/o Gaucher's disease, SVT, Atach, Afib, s/p ablation x 2 ([place] 2011 and [place] 2019) and Sinus node dysfunction and status post dual-chamber pacemaker implant.\par \par She has had symptoms of occasional fast heartbeats/rapid heartbeat/irregular heartbeat and shortness of breath.\par \par Echo showed preserved left ventricular function, no valvular disease and normal left atrial size.\par \par Previous nuclear stress test showed no evidence of ischemia or infarction.\par \par  [id number] score 2\par \par Findings on pacemaker interrogation: Episodes of  atrial tachycardia and atrial fibrillation short duration while burden currently less than 1%. Atrial paced 49% and ventricular paced less than 1%. Remaining battery longevity 12.years.\par \par We rediscussed treatment options -catheter ablation versus other antiarrhythmics. She has prefer to continue medical therapy for now.  We discussed amiodarone.  She prefer not to take this drug.  We will start her on flecainide 100 mg twice daily and increase dose as needed.  Patient be on metoprolol succinate ER 25 mg twice daily as well.  Recommend that she continue on anticoagulation.  Follow-up evaluation to reassess her tachycardia episodes.  Remote monitoring\par

## 2022-05-01 NOTE — PHYSICAL EXAM
[No Acute Distress] : no acute distress [Normal Conjunctiva] : normal conjunctiva [Normal Venous Pressure] : normal venous pressure [Normal S1, S2] : normal S1, S2 [No Murmur] : no murmur [Non Tender] : non-tender [Clear Lung Fields] : clear lung fields [Normal Gait] : normal gait [No Edema] : no edema [No Focal Deficits] : no focal deficits [Alert and Oriented] : alert and oriented

## 2022-05-01 NOTE — REVIEW OF SYSTEMS
[SOB] : shortness of breath [Palpitations] : palpitations [Feeling Fatigued] : not feeling fatigued [Blurry Vision] : no blurred vision [Sore Throat] : no sore throat [Chest Discomfort] : no chest discomfort [Orthopnea] : no orthopnea [Cough] : no cough [Abdominal Pain] : no abdominal pain [Dizziness] : no dizziness

## 2022-05-11 ENCOUNTER — APPOINTMENT (OUTPATIENT)
Dept: ELECTROPHYSIOLOGY | Facility: CLINIC | Age: 72
End: 2022-05-11

## 2023-03-17 ENCOUNTER — APPOINTMENT (OUTPATIENT)
Dept: RADIOLOGY | Facility: CLINIC | Age: 73
End: 2023-03-17
Payer: MEDICARE

## 2023-03-17 PROCEDURE — 77080 DXA BONE DENSITY AXIAL: CPT

## 2023-09-17 ENCOUNTER — TRANSCRIPTION ENCOUNTER (OUTPATIENT)
Age: 73
End: 2023-09-17

## 2024-07-22 ENCOUNTER — APPOINTMENT (OUTPATIENT)
Dept: RADIOLOGY | Facility: CLINIC | Age: 74
End: 2024-07-22
Payer: MEDICARE

## 2024-07-22 PROCEDURE — 72100 X-RAY EXAM L-S SPINE 2/3 VWS: CPT
